# Patient Record
Sex: MALE | Race: WHITE | NOT HISPANIC OR LATINO | Employment: OTHER | URBAN - NONMETROPOLITAN AREA
[De-identification: names, ages, dates, MRNs, and addresses within clinical notes are randomized per-mention and may not be internally consistent; named-entity substitution may affect disease eponyms.]

---

## 2017-10-03 RX ORDER — CARVEDILOL 3.12 MG/1
TABLET ORAL
Qty: 60 TABLET | Refills: 0 | OUTPATIENT
Start: 2017-10-03

## 2017-10-03 RX ORDER — ATORVASTATIN CALCIUM 80 MG/1
TABLET, FILM COATED ORAL
Qty: 30 TABLET | Refills: 0 | OUTPATIENT
Start: 2017-10-03

## 2021-05-24 ENCOUNTER — HOSPITAL ENCOUNTER (EMERGENCY)
Facility: HOSPITAL | Age: 71
Discharge: LEFT BEFORE DISCHARGE | End: 2021-05-24
Attending: EMERGENCY MEDICINE | Admitting: EMERGENCY MEDICINE

## 2021-05-24 VITALS
TEMPERATURE: 99 F | RESPIRATION RATE: 18 BRPM | OXYGEN SATURATION: 94 % | WEIGHT: 143.8 LBS | DIASTOLIC BLOOD PRESSURE: 95 MMHG | HEIGHT: 69 IN | SYSTOLIC BLOOD PRESSURE: 116 MMHG | BODY MASS INDEX: 21.3 KG/M2 | HEART RATE: 76 BPM

## 2021-05-24 DIAGNOSIS — Z59.00 HOMELESSNESS: Primary | ICD-10-CM

## 2021-05-24 PROCEDURE — 99281 EMR DPT VST MAYX REQ PHY/QHP: CPT

## 2021-05-24 SDOH — ECONOMIC STABILITY - HOUSING INSECURITY: HOMELESSNESS UNSPECIFIED: Z59.00

## 2021-05-24 NOTE — CASE MANAGEMENT/SOCIAL WORK
Continued Stay Note  [facility]     Patient Name: Wale Mayes  MRN: [id number]  Today's Date: 5/24/2021    Admit Date: 5/24/2021    Discharge Plan     Row Name 05/24/21 1321       Plan    Plan  Consult due to homelessness. Patient being cleared medically. Will follow to determine what options are open to patient.        Discharge Codes    No documentation.             Shelly Jeronimo LCSW

## 2021-05-24 NOTE — ED PROVIDER NOTES
"Subjective   Chief Complaint: Homeless  History of Present Illness: 70-year-old male comes in via EMS.  He was reportedly asleep under a tree in a park and was  and called EMS and he was brought here as he states he is homeless and has nowhere to go.  He denies any complaints of headache, chest pain, short of breath, abdominal pain or any illness.  He does have a history of coronary artery disease.  He has no complaints and does not want any work-up here.  He states \"I do not see any callin' for that as I do not have any problems right now.\"  He states he just needs help with some more ago as he is homeless.  Onset: Unknown  Timing: Ongoing  Exacerbating / Alleviating factors: Nothing makes symptoms better or worse as he has no symptoms at this time  Associated symptoms: None, no headache, no dizziness, no chest pain, no shortness of breath, no abdominal pain      Nurses Notes reviewed and agree, including vitals, allergies, social history and prior medical history.          Review of Systems   Constitutional:        Found sleeping in a local park stating he is homeless and needs help with his living situation   HENT: Negative.    Eyes: Negative.    Respiratory: Negative.    Cardiovascular: Negative.    Gastrointestinal: Negative.    Genitourinary: Negative.    Musculoskeletal: Negative.    Skin: Negative.    Allergic/Immunologic: Negative.    Neurological: Negative.    Psychiatric/Behavioral: Negative.    All other systems reviewed and are negative.      Past Medical History:   Diagnosis Date   • Coronary artery disease        No Known Allergies    Past Surgical History:   Procedure Laterality Date   • CARDIAC SURGERY         History reviewed. No pertinent family history.    Social History     Socioeconomic History   • Marital status: Single     Spouse name: Not on file   • Number of children: Not on file   • Years of education: Not on file   • Highest education level: Not on file   Tobacco Use   • Smoking " "status: Current Every Day Smoker     Packs/day: 2.00     Years: 20.00     Pack years: 40.00     Types: Cigarettes   • Smokeless tobacco: Current User   Substance and Sexual Activity   • Alcohol use: Yes     Comment: \"once in a while\"   • Drug use: Not Currently     Types: Marijuana   • Sexual activity: Defer           Objective   Physical Exam  Vitals and nursing note reviewed.   Constitutional:       General: He is not in acute distress.     Appearance: Normal appearance. He is normal weight. He is not ill-appearing or diaphoretic.   HENT:      Head: Normocephalic.      Nose: Nose normal.   Eyes:      Extraocular Movements: Extraocular movements intact.   Cardiovascular:      Rate and Rhythm: Normal rate and regular rhythm.   Pulmonary:      Effort: Pulmonary effort is normal.   Abdominal:      Palpations: Abdomen is soft.   Musculoskeletal:         General: Normal range of motion.      Cervical back: Normal range of motion.   Skin:     General: Skin is warm and dry.   Neurological:      General: No focal deficit present.      Mental Status: He is alert and oriented to person, place, and time.      Gait: Gait normal.   Psychiatric:         Mood and Affect: Mood normal.         Behavior: Behavior normal.         Procedures           ED Course      Social work spoke with the patient and found him shelter in [place].  He states he did not go there.  He did not want any testing done here.  He is awake alert oriented and ambulating around the room.  Shortly after social work spoke with the patient nursing staff alerted me that he had decided to leave [facility].  I did not have a chance to speak with the patient prior to him leaving.                                     MDM    Final diagnoses:   Homelessness       ED Disposition  ED Disposition     ED Disposition Condition Comment    [facility]            PATIENT [facility] [address]  [phone]    As needed    [facility] Emergency " Department  [address] [address] [phone]    If symptoms worsen         Medication List      No changes were made to your prescriptions during this visit.          Wenceslao Yanez PA-C  05/24/21 1452

## 2021-05-24 NOTE — ED NOTES
Social work contacted at this time, awaiting call back.     [facility]  05/24/21 0605

## 2021-05-24 NOTE — ED NOTES
Pt refusing help with placement because he does not want to go to [place]. Will sign out John Julien RN  05/24/21 5009

## 2021-05-24 NOTE — CASE MANAGEMENT/SOCIAL WORK
Discharge Planning Assessment  [facility]     Patient Name: Wale Mayes  MRN: [id number]  Today's Date: 5/24/2021    Admit Date: 5/24/2021    Discharge Needs Assessment     Row Name 05/24/21 1521       Living Environment    Lives With  other (see comments)    Unique Family Situation  homeless    Primary Care Provided by  self    Provides Primary Care For  no one    Able to Return to Prior Arrangements  yes    Living Arrangement Comments  living in woods       Resource/Environmental Concerns    Resource/Environmental Concerns  other (see comments)    Transportation Concerns  other (see comments)       Transition Planning    Patient/Family Anticipates Transition to  other (see comments)       Discharge Needs Assessment    Readmission Within the Last 30 Days  no previous admission in last 30 days    Equipment Currently Used at Home  none    Concerns to be Addressed  homelessness    Concerns Comments  refuses shelter    Provided Post Acute Provider List?  N/A    Discharge Coordination/Progress  homeless, sent to daughter's presumed work        Discharge Plan     Row Name 05/24/21 1527       Plan    Plan  Spoke to patient who stated he was on his disability , is on low income housing list. States he is staying in woods. No o2, dme or home health, guardian or community . Declines shelter in [place]. Picked up sleeping outside by ambulance and brought in. Has fishing pole. States not on any medications. Patient decided to get taxi voucher to daughter's work to see if she can help. Other kids are in [facility] and per patient are scared of him. Nurse aware. Provided taxi voucher for patient.    Row Name 05/24/21 4066       Plan    Plan  Consult due to homelessness. Patient being cleared medically. Will follow to determine what options are open to patient.        Continued Care and Services - Discharged on 5/24/2021 Admission date: 5/24/2021 - Discharge disposition: Home or Self Care   Coordination has not  been started for this encounter.         Demographic Summary     Row Name 05/24/21 1520       General Information    Admission Type  other (see comments)    Arrived From  emergency department    Referral Source  emergency department    Reason for Consult  other (see comments)    Preferred Language  English        Functional Status     Row Name 05/24/21 1521       Functional Status, IADL    Medications  independent    Meal Preparation  independent    Housekeeping  independent    Laundry  independent    Shopping  independent    IADL Comments  walks       Employment/    Employment Status  disabled        Psychosocial    No documentation.       Abuse/Neglect    No documentation.       Legal    No documentation.       Substance Abuse    No documentation.       Patient Forms    No documentation.           Shelly Jeronimo LCSW

## 2021-05-25 NOTE — CASE MANAGEMENT/SOCIAL WORK
Continued Stay Note  [facility]     Patient Name: Wale Mayes  MRN: [id number]  Today's Date: 5/25/2021    Admit Date: 5/24/2021    Discharge Plan     Row Name 05/25/21 0813       Plan    Plan  Consult on call . Patient now stating his daughter works at [employer] and he requested taxi voucher. House provided taxi voucher as requested.        Discharge Codes    No documentation.             MAI LermaW

## 2023-01-18 ENCOUNTER — HOME HEALTH ADMISSION (OUTPATIENT)
Dept: HOME HEALTH SERVICES | Facility: HOME HEALTHCARE | Age: 73
End: 2023-01-18
Payer: MEDICARE

## 2023-01-18 ENCOUNTER — TRANSCRIBE ORDERS (OUTPATIENT)
Dept: HOME HEALTH SERVICES | Facility: HOME HEALTHCARE | Age: 73
End: 2023-01-18
Payer: MEDICARE

## 2023-01-18 DIAGNOSIS — J96.01 ACUTE RESPIRATORY FAILURE WITH HYPOXIA: Primary | ICD-10-CM

## 2023-07-17 PROBLEM — I26.94 MULTIPLE SUBSEGMENTAL PULMONARY EMBOLI WITHOUT ACUTE COR PULMONALE: Chronic | Status: ACTIVE | Noted: 2023-07-08

## 2023-07-17 PROBLEM — I26.99 PULMONARY EMBOLISM, BILATERAL: Chronic | Status: ACTIVE | Noted: 2023-07-08

## 2023-07-17 PROBLEM — R60.0 PEDAL EDEMA: Status: ACTIVE | Noted: 2023-07-08

## 2023-07-17 PROBLEM — G93.40 ENCEPHALOPATHY: Status: ACTIVE | Noted: 2023-07-17

## 2023-07-17 PROBLEM — R06.2 WHEEZING: Status: ACTIVE | Noted: 2023-07-08

## 2023-07-17 PROBLEM — I21.4 NON-STEMI (NON-ST ELEVATED MYOCARDIAL INFARCTION): Chronic | Status: ACTIVE | Noted: 2023-07-08

## 2023-07-17 PROBLEM — J44.1 COPD WITH EXACERBATION: Status: ACTIVE | Noted: 2023-07-17

## 2023-07-17 PROBLEM — R22.42 LOCALIZED SWELLING, MASS AND LUMP, LOWER LIMB, LEFT: Status: ACTIVE | Noted: 2023-07-17

## 2023-07-17 PROBLEM — F10.231 ALCOHOL DEPENDENCE WITH WITHDRAWAL DELIRIUM: Status: ACTIVE | Noted: 2023-07-17

## 2023-07-17 PROBLEM — J96.20 RESPIRATORY FAILURE, ACUTE-ON-CHRONIC: Status: ACTIVE | Noted: 2023-07-17

## 2023-07-17 PROBLEM — R33.9 RETENTION OF URINE: Status: ACTIVE | Noted: 2023-07-17

## 2023-07-17 PROBLEM — R06.02 SOB (SHORTNESS OF BREATH): Status: ACTIVE | Noted: 2023-07-08

## 2023-07-17 PROBLEM — A41.9 SEPSIS, UNSPECIFIED ORGANISM: Status: ACTIVE | Noted: 2023-07-17

## 2023-07-17 PROBLEM — R07.9 CHEST PAIN: Status: ACTIVE | Noted: 2023-07-17

## 2023-07-17 PROBLEM — J43.8 OTHER EMPHYSEMA: Status: ACTIVE | Noted: 2023-07-17

## 2023-07-17 PROBLEM — I50.9 CHRONIC CONGESTIVE HEART FAILURE: Chronic | Status: ACTIVE | Noted: 2023-07-08

## 2023-07-17 PROBLEM — F17.200 NICOTINE DEPENDENCE: Status: ACTIVE | Noted: 2023-07-17

## 2023-07-18 PROBLEM — N40.1 BPH WITH OBSTRUCTION/LOWER URINARY TRACT SYMPTOMS: Status: ACTIVE | Noted: 2023-07-18

## 2023-07-18 PROBLEM — N13.8 BPH WITH OBSTRUCTION/LOWER URINARY TRACT SYMPTOMS: Status: ACTIVE | Noted: 2023-07-18

## 2023-07-18 PROBLEM — Z74.09 IMPAIRED MOBILITY AND ADLS: Status: ACTIVE | Noted: 2023-07-18

## 2023-07-18 PROBLEM — Z78.9 IMPAIRED MOBILITY AND ADLS: Status: ACTIVE | Noted: 2023-07-18

## 2023-07-18 PROBLEM — Z79.01 ANTICOAGULANT LONG-TERM USE: Status: ACTIVE | Noted: 2023-07-18

## 2023-07-19 ENCOUNTER — NURSING HOME (OUTPATIENT)
Dept: FAMILY MEDICINE CLINIC | Facility: CLINIC | Age: 73
End: 2023-07-19
Payer: MEDICARE

## 2023-07-19 VITALS
TEMPERATURE: 97.9 F | SYSTOLIC BLOOD PRESSURE: 100 MMHG | RESPIRATION RATE: 18 BRPM | DIASTOLIC BLOOD PRESSURE: 62 MMHG | WEIGHT: 124 LBS | BODY MASS INDEX: 18.31 KG/M2 | HEART RATE: 71 BPM | OXYGEN SATURATION: 99 %

## 2023-07-19 DIAGNOSIS — I26.99 PULMONARY EMBOLISM, BILATERAL: Chronic | ICD-10-CM

## 2023-07-19 DIAGNOSIS — Z74.09 IMPAIRED MOBILITY AND ADLS: Primary | ICD-10-CM

## 2023-07-19 DIAGNOSIS — I50.22 CHRONIC SYSTOLIC CONGESTIVE HEART FAILURE: Chronic | ICD-10-CM

## 2023-07-19 DIAGNOSIS — Z79.01 ANTICOAGULANT LONG-TERM USE: ICD-10-CM

## 2023-07-19 DIAGNOSIS — M86.9 OSTEOMYELITIS OF FIFTH TOE OF LEFT FOOT: ICD-10-CM

## 2023-07-19 DIAGNOSIS — N13.8 BPH WITH OBSTRUCTION/LOWER URINARY TRACT SYMPTOMS: ICD-10-CM

## 2023-07-19 DIAGNOSIS — R53.81 PHYSICAL DECONDITIONING: ICD-10-CM

## 2023-07-19 DIAGNOSIS — J96.21 ACUTE ON CHRONIC RESPIRATORY FAILURE WITH HYPOXIA: ICD-10-CM

## 2023-07-19 DIAGNOSIS — J96.11 CHRONIC RESPIRATORY FAILURE WITH HYPOXIA: ICD-10-CM

## 2023-07-19 DIAGNOSIS — F17.200 NICOTINE DEPENDENCE, UNCOMPLICATED, UNSPECIFIED NICOTINE PRODUCT TYPE: ICD-10-CM

## 2023-07-19 DIAGNOSIS — Z78.9 IMPAIRED MOBILITY AND ADLS: Primary | ICD-10-CM

## 2023-07-19 DIAGNOSIS — I73.9 PERIPHERAL VASCULAR DISEASE: ICD-10-CM

## 2023-07-19 DIAGNOSIS — N40.1 BPH WITH OBSTRUCTION/LOWER URINARY TRACT SYMPTOMS: ICD-10-CM

## 2023-07-29 ENCOUNTER — HOSPITAL ENCOUNTER (EMERGENCY)
Facility: HOSPITAL | Age: 73
Discharge: NURSING, REHAB OR LONG-TERM CARE | End: 2023-07-29
Attending: EMERGENCY MEDICINE
Payer: MEDICARE

## 2023-07-29 VITALS
WEIGHT: 124 LBS | RESPIRATION RATE: 16 BRPM | BODY MASS INDEX: 18.37 KG/M2 | DIASTOLIC BLOOD PRESSURE: 62 MMHG | HEIGHT: 69 IN | TEMPERATURE: 98.7 F | HEART RATE: 94 BPM | OXYGEN SATURATION: 96 % | SYSTOLIC BLOOD PRESSURE: 118 MMHG

## 2023-07-29 DIAGNOSIS — Z45.2 ADJUSTMENT AND MANAGEMENT OF VASCULAR ACCESS DEVICE: Primary | ICD-10-CM

## 2023-07-29 PROCEDURE — 25010000002 HEPARIN LOCK FLUSH PER 10 UNITS

## 2023-07-29 PROCEDURE — 99283 EMERGENCY DEPT VISIT LOW MDM: CPT

## 2023-07-29 RX ORDER — HEPARIN SODIUM (PORCINE) LOCK FLUSH IV SOLN 100 UNIT/ML 100 UNIT/ML
SOLUTION INTRAVENOUS
Status: COMPLETED
Start: 2023-07-29 | End: 2023-07-29

## 2023-07-29 RX ORDER — HEPARIN SODIUM (PORCINE) LOCK FLUSH IV SOLN 100 UNIT/ML 100 UNIT/ML
300 SOLUTION INTRAVENOUS ONCE
Status: COMPLETED | OUTPATIENT
Start: 2023-07-29 | End: 2023-07-29

## 2023-07-29 RX ADMIN — HEPARIN 300 UNITS: 100 SYRINGE at 01:00

## 2023-07-29 RX ADMIN — HEPARIN SODIUM (PORCINE) LOCK FLUSH IV SOLN 100 UNIT/ML 300 UNITS: 100 SOLUTION at 01:00

## 2023-08-03 ENCOUNTER — NURSING HOME (OUTPATIENT)
Dept: FAMILY MEDICINE CLINIC | Facility: CLINIC | Age: 73
End: 2023-08-03
Payer: MEDICARE

## 2023-08-03 VITALS
OXYGEN SATURATION: 97 % | HEART RATE: 74 BPM | SYSTOLIC BLOOD PRESSURE: 122 MMHG | DIASTOLIC BLOOD PRESSURE: 60 MMHG | WEIGHT: 121 LBS | BODY MASS INDEX: 17.87 KG/M2 | TEMPERATURE: 97.8 F | RESPIRATION RATE: 18 BRPM

## 2023-08-03 DIAGNOSIS — Z78.9 IMPAIRED MOBILITY AND ADLS: ICD-10-CM

## 2023-08-03 DIAGNOSIS — M54.50 CHRONIC MIDLINE LOW BACK PAIN WITHOUT SCIATICA: ICD-10-CM

## 2023-08-03 DIAGNOSIS — Z74.09 IMPAIRED MOBILITY AND ADLS: ICD-10-CM

## 2023-08-03 DIAGNOSIS — R63.4 WEIGHT LOSS: Primary | ICD-10-CM

## 2023-08-03 DIAGNOSIS — G89.29 CHRONIC MIDLINE LOW BACK PAIN WITHOUT SCIATICA: ICD-10-CM

## 2023-08-03 DIAGNOSIS — R63.0 ANOREXIA: ICD-10-CM

## 2023-08-04 ENCOUNTER — NURSING HOME (OUTPATIENT)
Dept: FAMILY MEDICINE CLINIC | Facility: CLINIC | Age: 73
End: 2023-08-04
Payer: MEDICARE

## 2023-08-04 VITALS
SYSTOLIC BLOOD PRESSURE: 134 MMHG | DIASTOLIC BLOOD PRESSURE: 74 MMHG | OXYGEN SATURATION: 91 % | TEMPERATURE: 98.1 F | RESPIRATION RATE: 24 BRPM | WEIGHT: 121 LBS | BODY MASS INDEX: 17.87 KG/M2 | HEART RATE: 78 BPM

## 2023-08-04 DIAGNOSIS — Z74.09 IMPAIRED MOBILITY AND ADLS: ICD-10-CM

## 2023-08-04 DIAGNOSIS — J96.11 CHRONIC RESPIRATORY FAILURE WITH HYPOXIA: ICD-10-CM

## 2023-08-04 DIAGNOSIS — J42 CHRONIC BRONCHITIS, UNSPECIFIED CHRONIC BRONCHITIS TYPE: ICD-10-CM

## 2023-08-04 DIAGNOSIS — F17.210 CIGARETTE NICOTINE DEPENDENCE WITHOUT COMPLICATION: ICD-10-CM

## 2023-08-04 DIAGNOSIS — Z78.9 IMPAIRED MOBILITY AND ADLS: ICD-10-CM

## 2023-08-04 NOTE — LETTER
Nursing Home Follow Up Note      Rishi Castellano DO []   [facility] [x]  [address] [place] [address]  Phone: [phone]  Fax: [phone] Samm Mclaughlin MD []    Oscar Mena DO []   [address] [place] [address]  Phone: [phone]  Fax: [phone]     PATIENT NAME: Wale Mayes                                                                          YOB: 1950           DATE OF SERVICE: 08/4/2023  FACILITY:  [][facility]   [] [facility]   [] [facility]   [x] [facility]   [] Other ______________________________________________________________________      CHIEF COMPLAINT:    Increased cough and shortness of breath this am.     HISTORY OF PRESENT ILLNESS:     Nursing reports that early this morning when patient first woke up he had increased cough and increased shortness of breath. His 02 was 91 % on RA. He was placed on 2L NC and sats increased to 94%. He reported he felt better after this. He reported to nursing that he is always more short of breath in the mornings related to his COPD. Nursing reports that shortly after he felt better he removed his oxygen and went outside for the 0900 smoke break. He is moving about the facility in his wheelchair during visit. He reports he feels better now and has no concerns. Again reports that he always has issues early in the morning due to his COPD. He does not want to see if he needs C Pap or 02 at night because he will not wear it. He is getting ready to go out to smoke again now. He has no complaints or signs and symptoms of any distress. He did have CXR performed and results report no abnormal findings.     PAST MEDICAL & SURGICAL HISTORY:   Past Medical History:   Diagnosis Date    Alcohol dependence     BPH (benign prostatic hyperplasia)     COPD (chronic obstructive pulmonary disease)     Coronary artery disease     Depression     GERD (gastroesophageal reflux disease)     Hypertension     Myocardial infarction      "Nicotine dependence, cigarettes, with other nicotine-induced disorders     Pneumonia     Pulmonary embolism     SOB (shortness of breath)       Past Surgical History:   Procedure Laterality Date    CARDIAC SURGERY           MEDICATIONS:  I have reviewed and reconciled the patients medication list in the patients chart at the skilled nursing facility today.      ALLERGIES:    No Known Allergies      SOCIAL HISTORY:    Social History     Socioeconomic History    Marital status: Single   Tobacco Use    Smoking status: Every Day     Packs/day: 2.00     Years: 20.00     Pack years: 40.00     Types: Cigarettes    Smokeless tobacco: Never   Vaping Use    Vaping Use: Unknown   Substance and Sexual Activity    Alcohol use: Yes     Comment: \"once in a while\"    Drug use: Not Currently     Types: Marijuana    Sexual activity: Defer       FAMILY HISTORY:    Family History   Family history unknown: Yes       REVIEW OF SYSTEMS:    Review of Systems   Constitutional:  Positive for appetite change and unexpected weight loss. Negative for activity change, chills, diaphoresis, fatigue, fever and unexpected weight gain.   HENT:  Negative for congestion, mouth sores, nosebleeds, postnasal drip, rhinorrhea, sneezing, sore throat, swollen glands and trouble swallowing.    Eyes:  Negative for pain, discharge, redness, itching and visual disturbance.   Respiratory:  Positive for apnea, cough and shortness of breath. Negative for choking, chest tightness and wheezing.         COPD   Cardiovascular:  Negative for chest pain, palpitations and leg swelling.   Gastrointestinal:  Negative for abdominal distention, abdominal pain, blood in stool, constipation, diarrhea, nausea, vomiting, GERD and indigestion.   Genitourinary:  Negative for decreased urine volume, difficulty urinating, dysuria, flank pain, frequency, hematuria, urgency and urinary incontinence.   Musculoskeletal:  Positive for arthralgias, back pain and gait problem. Negative for " myalgias.   Skin:  Positive for wound (left foot-chronic). Negative for color change, dry skin, rash and skin lesions.   Neurological:  Positive for weakness, memory problem and confusion. Negative for seizures and speech difficulty.   Psychiatric/Behavioral:  Positive for agitation, behavioral problems and depressed mood. Negative for dysphoric mood, hallucinations and sleep disturbance. The patient is not nervous/anxious.        PHYSICAL EXAMINATION:   VITAL SIGNS:   Vitals:    08/04/23 1437   BP: 134/74   Pulse: 78   Resp: 24   Temp: 98.1 øF (36.7 øC)   SpO2: 91%   Weight: 54.9 kg (121 lb)        Physical Exam  Vitals and nursing note reviewed.   Constitutional:       Appearance: He is well-developed.   HENT:      Head: Normocephalic.   Eyes:      Conjunctiva/sclera: Conjunctivae normal.   Cardiovascular:      Rate and Rhythm: Normal rate and regular rhythm.      Heart sounds: Normal heart sounds.   Pulmonary:      Effort: Pulmonary effort is normal. No respiratory distress.      Breath sounds: Decreased breath sounds present.      Comments: COPD  Abdominal:      General: Bowel sounds are normal. There is no distension.      Palpations: Abdomen is soft.      Tenderness: There is no abdominal tenderness.   Musculoskeletal:      Cervical back: Normal range of motion.      Lumbar back: Tenderness present. Decreased range of motion.        Feet:       Comments: LE weakness   Skin:     General: Skin is warm and dry.      Findings: No rash.   Neurological:      Mental Status: He is alert and oriented to person, place, and time.      Comments: Intermittent confusion   Psychiatric:         Mood and Affect: Mood normal. Affect is flat.         Behavior: Behavior normal.         Cognition and Memory: Cognition is impaired. Memory is impaired.       RECORDS REVIEW:   I have reviewed and interpreted the records in [facility]    ASSESSMENT     Diagnoses and all orders for this visit:    1. Chronic bronchitis, unspecified chronic  "bronchitis type    2. Cigarette nicotine dependence without complication    3. Chronic respiratory failure with hypoxia    4. Impaired mobility and ADLs        PLAN    COPD/tobacco dependence/chronic respiratory failure  -CXR report negative for any acute findings. 02 at 2-3 L prn to keep sats > 90%. He is aware of risks of continue tobacco abuse but is not interested in cessation at this time. He refuses any further workup. Administer prn Nebs and cough medication.     Will follow up and continue to monitor.     Nursing to continue supportive care for all ADLs.    Nursing encouraged to keep me informed of any acute changes, lack of improvement, or any new concerning symptoms.       [x]  Discussed Patient in detail with nursing/staff, addressed all needs today.     [x]  Plan of Care Reviewed   [x]  PT/OT Reviewed   [x]  Order Changes  [x]  Discharge Plans Reviewed  [x]  Advance Directive on file with Nursing Home.   [x]  POA on file with Nursing Home.   [x]  Code Status listed: []  Full Code   []  DNR         "I confirm accuracy of unchanged data/findings which have been carried forward from previous visit, as well as I have updated appropriately those that have changed."       Staci Farmer, APRN.       "

## 2023-08-05 PROBLEM — A41.9 SEPSIS, UNSPECIFIED ORGANISM: Status: RESOLVED | Noted: 2023-07-17 | Resolved: 2023-08-05

## 2023-08-06 PROBLEM — J96.11 CHRONIC RESPIRATORY FAILURE WITH HYPOXIA: Status: ACTIVE | Noted: 2023-08-06

## 2023-08-06 NOTE — ED PROVIDER NOTES
"Subjective  History of Present Illness:    Chief Complaint: PICC line would not flush    History of Present Illness: 72-year-old male presents from nursing home, per report PICC line would not flush    Nurses Notes reviewed and agree, including vitals, allergies, social history and prior medical history.     REVIEW OF SYSTEMS: All systems reviewed and not pertinent unless noted.  Review of Systems      Positive for: PICC line would not flush    Negative for: Other complaints    Past Medical History:   Diagnosis Date    Alcohol dependence     BPH (benign prostatic hyperplasia)     COPD (chronic obstructive pulmonary disease)     Coronary artery disease     Depression     GERD (gastroesophageal reflux disease)     Hypertension     Myocardial infarction     Nicotine dependence, cigarettes, with other nicotine-induced disorders     Pneumonia     Pulmonary embolism     SOB (shortness of breath)        Allergies:    Patient has no known allergies.      Past Surgical History:   Procedure Laterality Date    CARDIAC SURGERY           Social History     Socioeconomic History    Marital status: Single   Tobacco Use    Smoking status: Every Day     Packs/day: 2.00     Years: 20.00     Pack years: 40.00     Types: Cigarettes    Smokeless tobacco: Never   Vaping Use    Vaping Use: Unknown   Substance and Sexual Activity    Alcohol use: Yes     Comment: \"once in a while\"    Drug use: Not Currently     Types: Marijuana    Sexual activity: Defer         Family History   Family history unknown: Yes       Objective  Physical Exam:  /62 (BP Location: Left arm, Patient Position: Lying)   Pulse 94   Temp 98.7 øF (37.1 øC) (Oral)   Resp 16   Ht 175.3 cm (69\")   Wt 56.2 kg (124 lb)   SpO2 96%   BMI 18.31 kg/mý      Physical Exam    CONSTITUTIONAL: Well developed, nontoxic 72-year-old male,  in no acute distress.  VITAL SIGNS: per nursing, reviewed and noted  SKIN: exposed skin with no rashes, ulcerations or petechiae.  PICC " line in place right upper extremity with dressing overlying PICC line with a PICC line sitting at an odd angle  EYES: Grossly EOMI, no icterus  ENT: Normal voice.  Moist mucous membranes   RESPIRATORY:  No increased work of breathing. No retractions.   CARDIOVASCULAR:   Extremities pink and warm.  Good cap refill to extremities.   MUSCULOSKELETAL: Age-appropriate bulk and tone, moves all 4 extremities  NEUROLOGIC: Alert, oriented x 3. No gross deficits. GCS 15.   PSYCH: appropriate affect.      Procedures    ED Course:    Lab Results (last 24 hours)       ** No results found for the last 24 hours. **             No radiology results from the last 24 hrs     MDM         Medical Decision Making:    Initial impression of presenting illness: 72-year-old male presents from nursing home, per report PICC line would not flush    DDX: includes but is not limited to: PICC line occlusion, displacement, among others         Patient arrives normotensive afebrile oxygen saturations 96% with vitals interpreted by myself.     Pertinent features from physical exam: PICC line in place right upper extremity with odd angle of positioning with dressing covering the insertion site.    Interventions / Re-evaluation: Nursing staff changed the dressing and ready position to the PICC line with good flush and flow    Results/clinical rationale were discussed with patient    Consultations/Discussion of results with other physicians: None    Disposition plan: Patient was discharged in home stable condition.  Counseled on supportive care, outpatient follow-up. Return precaution discussed.  Patient/family was understanding and agreeable with plan    -----      Final diagnoses:   Adjustment and management of vascular access device            Hoang Smith, DO  08/06/23 7859

## 2023-08-06 NOTE — PROGRESS NOTES
Nursing Home Follow Up Note      Rishi Castellano DO []   HARLEY Dao [x]  [address] [place] [address]  Phone: [phone]  Fax: [phone] Samm Mclaughlin MD []    Oscar Mena DO []   [address] [place] [address]  Phone: [phone]  Fax: [phone]     PATIENT NAME: Wale Mayes                                                                          YOB: 1950           DATE OF SERVICE: 08/4/2023  FACILITY:  [][place]   [] [place]   [] [facility]   [x] [facility]   [] Other ______________________________________________________________________      CHIEF COMPLAINT:    Increased cough and shortness of breath this am.     HISTORY OF PRESENT ILLNESS:     Nursing reports that early this morning when patient first woke up he had increased cough and increased shortness of breath. His 02 was 91 % on RA. He was placed on 2L NC and sats increased to 94%. He reported he felt better after this. He reported to nursing that he is always more short of breath in the mornings related to his COPD. Nursing reports that shortly after he felt better he removed his oxygen and went outside for the 0900 smoke break. He is moving about the facility in his wheelchair during visit. He reports he feels better now and has no concerns. Again reports that he always has issues early in the morning due to his COPD. He does not want to see if he needs C Pap or 02 at night because he will not wear it. He is getting ready to go out to smoke again now. He has no complaints or signs and symptoms of any distress. He did have CXR performed and results report no abnormal findings.     PAST MEDICAL & SURGICAL HISTORY:   Past Medical History:   Diagnosis Date    Alcohol dependence     BPH (benign prostatic hyperplasia)     COPD (chronic obstructive pulmonary disease)     Coronary artery disease     Depression     GERD (gastroesophageal reflux disease)     Hypertension     Myocardial infarction      "Nicotine dependence, cigarettes, with other nicotine-induced disorders     Pneumonia     Pulmonary embolism     SOB (shortness of breath)       Past Surgical History:   Procedure Laterality Date    CARDIAC SURGERY           MEDICATIONS:  I have reviewed and reconciled the patients medication list in the patients chart at the skilled nursing facility today.      ALLERGIES:    No Known Allergies      SOCIAL HISTORY:    Social History     Socioeconomic History    Marital status: Single   Tobacco Use    Smoking status: Every Day     Packs/day: 2.00     Years: 20.00     Pack years: 40.00     Types: Cigarettes    Smokeless tobacco: Never   Vaping Use    Vaping Use: Unknown   Substance and Sexual Activity    Alcohol use: Yes     Comment: \"once in a while\"    Drug use: Not Currently     Types: Marijuana    Sexual activity: Defer       FAMILY HISTORY:    Family History   Family history unknown: Yes       REVIEW OF SYSTEMS:    Review of Systems   Constitutional:  Positive for appetite change and unexpected weight loss. Negative for activity change, chills, diaphoresis, fatigue, fever and unexpected weight gain.   HENT:  Negative for congestion, mouth sores, nosebleeds, postnasal drip, rhinorrhea, sneezing, sore throat, swollen glands and trouble swallowing.    Eyes:  Negative for pain, discharge, redness, itching and visual disturbance.   Respiratory:  Positive for apnea, cough and shortness of breath. Negative for choking, chest tightness and wheezing.         COPD   Cardiovascular:  Negative for chest pain, palpitations and leg swelling.   Gastrointestinal:  Negative for abdominal distention, abdominal pain, blood in stool, constipation, diarrhea, nausea, vomiting, GERD and indigestion.   Genitourinary:  Negative for decreased urine volume, difficulty urinating, dysuria, flank pain, frequency, hematuria, urgency and urinary incontinence.   Musculoskeletal:  Positive for arthralgias, back pain and gait problem. Negative for " myalgias.   Skin:  Positive for wound (left foot-chronic). Negative for color change, dry skin, rash and skin lesions.   Neurological:  Positive for weakness, memory problem and confusion. Negative for seizures and speech difficulty.   Psychiatric/Behavioral:  Positive for agitation, behavioral problems and depressed mood. Negative for dysphoric mood, hallucinations and sleep disturbance. The patient is not nervous/anxious.        PHYSICAL EXAMINATION:   VITAL SIGNS:   Vitals:    08/04/23 1437   BP: 134/74   Pulse: 78   Resp: 24   Temp: 98.1 øF (36.7 øC)   SpO2: 91%   Weight: 54.9 kg (121 lb)        Physical Exam  Vitals and nursing note reviewed.   Constitutional:       Appearance: He is well-developed.   HENT:      Head: Normocephalic.   Eyes:      Conjunctiva/sclera: Conjunctivae normal.   Cardiovascular:      Rate and Rhythm: Normal rate and regular rhythm.      Heart sounds: Normal heart sounds.   Pulmonary:      Effort: Pulmonary effort is normal. No respiratory distress.      Breath sounds: Decreased breath sounds present.      Comments: COPD  Abdominal:      General: Bowel sounds are normal. There is no distension.      Palpations: Abdomen is soft.      Tenderness: There is no abdominal tenderness.   Musculoskeletal:      Cervical back: Normal range of motion.      Lumbar back: Tenderness present. Decreased range of motion.        Feet:       Comments: LE weakness   Skin:     General: Skin is warm and dry.      Findings: No rash.   Neurological:      Mental Status: He is alert and oriented to person, place, and time.      Comments: Intermittent confusion   Psychiatric:         Mood and Affect: Mood normal. Affect is flat.         Behavior: Behavior normal.         Cognition and Memory: Cognition is impaired. Memory is impaired.       RECORDS REVIEW:   I have reviewed and interpreted [facility]    ASSESSMENT     Diagnoses and all orders for this visit:    1. Chronic bronchitis, unspecified chronic  "bronchitis type    2. Cigarette nicotine dependence without complication    3. Chronic respiratory failure with hypoxia    4. Impaired mobility and ADLs        PLAN    COPD/tobacco dependence/chronic respiratory failure  -CXR report negative for any acute findings. 02 at 2-3 L prn to keep sats > 90%. He is aware of risks of continue tobacco abuse but is not interested in cessation at this time. He refuses any further workup. Administer prn Nebs and cough medication.     Will follow up and continue to monitor.     Nursing to continue supportive care for all ADLs.    Nursing encouraged to keep me informed of any acute changes, lack of improvement, or any new concerning symptoms.       [x]  Discussed Patient in detail with nursing/staff, addressed all needs today.     [x]  Plan of Care Reviewed   [x]  PT/OT Reviewed   [x]  Order Changes  [x]  Discharge Plans Reviewed  [x]  Advance Directive on file with Nursing Home.   [x]  POA on file with Nursing Home.   [x]  Code Status listed: []  Full Code   []  DNR         "I confirm accuracy of unchanged data/findings which have been carried forward from previous visit, as well as I have updated appropriately those that have changed."       Staci Farmer, APRN.     "

## 2023-08-18 ENCOUNTER — NURSING HOME (OUTPATIENT)
Dept: FAMILY MEDICINE CLINIC | Facility: CLINIC | Age: 73
End: 2023-08-18
Payer: MEDICARE

## 2023-08-18 VITALS
BODY MASS INDEX: 17.28 KG/M2 | HEART RATE: 99 BPM | TEMPERATURE: 98.2 F | RESPIRATION RATE: 18 BRPM | DIASTOLIC BLOOD PRESSURE: 78 MMHG | WEIGHT: 117 LBS | OXYGEN SATURATION: 93 % | SYSTOLIC BLOOD PRESSURE: 118 MMHG

## 2023-08-18 DIAGNOSIS — R53.81 PHYSICAL DEBILITY: ICD-10-CM

## 2023-08-18 DIAGNOSIS — R53.83 FATIGUE, UNSPECIFIED TYPE: Primary | ICD-10-CM

## 2023-08-18 DIAGNOSIS — B37.49 CANDIDAL UTI (URINARY TRACT INFECTION): ICD-10-CM

## 2023-08-18 DIAGNOSIS — Z78.9 IMPAIRED MOBILITY AND ADLS: ICD-10-CM

## 2023-08-18 DIAGNOSIS — Z74.09 IMPAIRED MOBILITY AND ADLS: ICD-10-CM

## 2023-08-18 NOTE — LETTER
Nursing Home Follow Up Note      Rishi Castellano DO []   HARLEY Dao [x]  [address] [place] [address]  Phone: [phone]  Fax: [phone] Samm Mclaughlin MD []    Oscar Mena DO []   [address] [place] [address]  Phone: [phone]  Fax: [phone]     PATIENT NAME: Wale Mayes                                                                          YOB: 1950           DATE OF SERVICE: 08/18/2023  FACILITY:  [][place]   [] [place]   [] [facility]   [x] [facility]   [] Other ______________________________________________________________________      CHIEF COMPLAINT:    Increased fatigue for the past couple days.    Follow-up abnormal UA C&S results.    HISTORY OF PRESENT ILLNESS:     Nursing reports that patient has been more fatigued than usual the past couple days.  He was sitting up on the side of the bed during visit putting on his shoes to go out and smoke.  He reports that he had not felt as well as usual the past couple days but he does feel better today.  He will continue on IV Zosyn until 8/26 and then will start doxycycline oral, for treatment of his osteomyelitis.  Labs were performed related to his fatigue and were insignificant.  UA C&S results available today and report candidiasis.  He has no complaints or concerns today besides wishing he could go outside more often and smoke.  He does appear fatigued, but it appears at baseline.  No signs and symptoms of any distress.      PAST MEDICAL & SURGICAL HISTORY:   Past Medical History:   Diagnosis Date    Alcohol dependence     BPH (benign prostatic hyperplasia)     COPD (chronic obstructive pulmonary disease)     Coronary artery disease     Depression     GERD (gastroesophageal reflux disease)     Hypertension     Myocardial infarction     Nicotine dependence, cigarettes, with other nicotine-induced disorders     Pneumonia     Pulmonary embolism     SOB (shortness of breath)       Past Surgical  "History:   Procedure Laterality Date    CARDIAC SURGERY           MEDICATIONS:  I have reviewed and reconciled the patients medication list in the patients chart at the skilled nursing facility today.      ALLERGIES:    No Known Allergies      SOCIAL HISTORY:    Social History     Socioeconomic History    Marital status: Single   Tobacco Use    Smoking status: Every Day     Packs/day: 2.00     Years: 20.00     Pack years: 40.00     Types: Cigarettes    Smokeless tobacco: Never   Vaping Use    Vaping Use: Unknown   Substance and Sexual Activity    Alcohol use: Yes     Comment: \"once in a while\"    Drug use: Not Currently     Types: Marijuana    Sexual activity: Defer       FAMILY HISTORY:    Family History   Family history unknown: Yes       REVIEW OF SYSTEMS:    Review of Systems   Constitutional:  Positive for appetite change, fatigue and unexpected weight loss. Negative for activity change, chills, diaphoresis, fever and unexpected weight gain.   HENT:  Negative for congestion, mouth sores, nosebleeds, postnasal drip, rhinorrhea, sneezing, sore throat, swollen glands and trouble swallowing.    Eyes:  Negative for pain, discharge, redness, itching and visual disturbance.   Respiratory:  Positive for apnea, cough and shortness of breath. Negative for choking, chest tightness and wheezing.         COPD   Cardiovascular:  Negative for chest pain, palpitations and leg swelling.   Gastrointestinal:  Negative for abdominal distention, abdominal pain, blood in stool, constipation, diarrhea, nausea, vomiting, GERD and indigestion.   Genitourinary:  Negative for decreased urine volume, difficulty urinating, dysuria, flank pain, frequency, hematuria, urgency and urinary incontinence.   Musculoskeletal:  Positive for arthralgias, back pain and gait problem. Negative for myalgias.   Skin:  Positive for wound (left foot-chronic). Negative for color change, dry skin, rash and skin lesions.   Neurological:  Positive for weakness, " memory problem and confusion. Negative for seizures and speech difficulty.   Psychiatric/Behavioral:  Positive for agitation, behavioral problems and depressed mood. Negative for dysphoric mood, hallucinations and sleep disturbance. The patient is not nervous/anxious.        PHYSICAL EXAMINATION:   VITAL SIGNS:   Vitals:    08/18/23 1314   BP: 118/78   Pulse: 99   Resp: 18   Temp: 98.2 øF (36.8 øC)   SpO2: 93%   Weight: 53.1 kg (117 lb)        Physical Exam  Vitals and nursing note reviewed.   Constitutional:       Appearance: He is well-developed.   HENT:      Head: Normocephalic.   Eyes:      Conjunctiva/sclera: Conjunctivae normal.   Cardiovascular:      Rate and Rhythm: Normal rate and regular rhythm.      Heart sounds: Normal heart sounds.   Pulmonary:      Effort: Pulmonary effort is normal. No respiratory distress.      Breath sounds: Decreased breath sounds present.      Comments: COPD  Abdominal:      General: Bowel sounds are normal. There is no distension.      Palpations: Abdomen is soft.      Tenderness: There is no abdominal tenderness.   Musculoskeletal:      Cervical back: Normal range of motion.      Lumbar back: Tenderness present. Decreased range of motion.        Feet:       Comments: LE weakness   Skin:     General: Skin is warm and dry.      Findings: No rash.   Neurological:      Mental Status: He is alert and oriented to person, place, and time.      Comments: Intermittent confusion   Psychiatric:         Mood and Affect: Affect is flat and angry.         Behavior: Behavior normal.         Cognition and Memory: Cognition is impaired. Memory is impaired.      Comments: Angry that he cannot go out and smoke whenever he would like       RECORDS REVIEW:   I have reviewed and interpreted the records in [facility]    ASSESSMENT     Diagnoses and all orders for this visit:    1. Fatigue, unspecified type (Primary)    2. Candidal UTI (urinary tract infection)    3. Physical debility    4. Impaired mobility  "and ADLs        PLAN    Fatigue/candidal UTI  -Patient reports feeling better today.  Nursing to continue to monitor notify of any changes.  Will start Diflucan 100 mg p.o. daily x14 days.  Will follow-up with final results of C&S.    Will follow up and continue to monitor.     Nursing to continue supportive care for all ADLs.    Nursing encouraged to keep me informed of any acute changes, lack of improvement, or any new concerning symptoms.       [x]  Discussed Patient in detail with nursing/staff, addressed all needs today.     [x]  Plan of Care Reviewed   [x]  PT/OT Reviewed   [x]  Order Changes  [x]  Discharge Plans Reviewed  [x]  Advance Directive on file with Nursing Home.   [x]  POA on file with Nursing Home.   [x]  Code Status listed: []  Full Code   []  DNR         "I confirm accuracy of unchanged data/findings which have been carried forward from previous visit, as well as I have updated appropriately those that have changed."     I spent 30 minutes caring for Wale on this date of service. This time includes time spent by me in the following activities:preparing for the visit, reviewing tests, obtaining and/or reviewing a separately obtained history, performing a medically appropriate examination and/or evaluation , counseling and educating the patient/family/caregiver, ordering medications, tests, or procedures, referring and communicating with other health care professionals , documenting information in the medical record, independently interpreting results and communicating that information with the patient/family/caregiver, and care coordination       Staci Farmer, APRN.       "

## 2023-08-20 NOTE — PROGRESS NOTES
Nursing Home Follow Up Note      Rishi Castellano DO []   HARLEY Dao [x]  [address] [place] [address]  Phone: [phone]  Fax: [phone] Samm Mclaughlin MD []    Oscar Mena DO []   [address] [place] [address]  Phone: [phone]  Fax: [phone]     PATIENT NAME: Wale Mayes                                                                          YOB: 1950           DATE OF SERVICE: 08/18/2023  FACILITY:  [][place]   [] [place]   [] [facility]   [x] [facility]   [] Other ______________________________________________________________________      CHIEF COMPLAINT:    Increased fatigue for the past couple days.    Follow-up abnormal UA C&S results.    HISTORY OF PRESENT ILLNESS:     Nursing reports that patient has been more fatigued than usual the past couple days.  He was sitting up on the side of the bed during visit putting on his shoes to go out and smoke.  He reports that he had not felt as well as usual the past couple days but he does feel better today.  He will continue on IV Zosyn until 8/26 and then will start doxycycline oral, for treatment of his osteomyelitis.  Labs were performed related to his fatigue and were insignificant.  UA C&S results available today and report candidiasis.  He has no complaints or concerns today besides wishing he could go outside more often and smoke.  He does appear fatigued, but it appears at baseline.  No signs and symptoms of any distress.      PAST MEDICAL & SURGICAL HISTORY:   Past Medical History:   Diagnosis Date    Alcohol dependence     BPH (benign prostatic hyperplasia)     COPD (chronic obstructive pulmonary disease)     Coronary artery disease     Depression     GERD (gastroesophageal reflux disease)     Hypertension     Myocardial infarction     Nicotine dependence, cigarettes, with other nicotine-induced disorders     Pneumonia     Pulmonary embolism     SOB (shortness of breath)       Past Surgical  "History:   Procedure Laterality Date    CARDIAC SURGERY           MEDICATIONS:  I have reviewed and reconciled the patients medication list in the patients chart at the skilled nursing facility today.      ALLERGIES:    No Known Allergies      SOCIAL HISTORY:    Social History     Socioeconomic History    Marital status: Single   Tobacco Use    Smoking status: Every Day     Packs/day: 2.00     Years: 20.00     Pack years: 40.00     Types: Cigarettes    Smokeless tobacco: Never   Vaping Use    Vaping Use: Unknown   Substance and Sexual Activity    Alcohol use: Yes     Comment: \"once in a while\"    Drug use: Not Currently     Types: Marijuana    Sexual activity: Defer       FAMILY HISTORY:    Family History   Family history unknown: Yes       REVIEW OF SYSTEMS:    Review of Systems   Constitutional:  Positive for appetite change, fatigue and unexpected weight loss. Negative for activity change, chills, diaphoresis, fever and unexpected weight gain.   HENT:  Negative for congestion, mouth sores, nosebleeds, postnasal drip, rhinorrhea, sneezing, sore throat, swollen glands and trouble swallowing.    Eyes:  Negative for pain, discharge, redness, itching and visual disturbance.   Respiratory:  Positive for apnea, cough and shortness of breath. Negative for choking, chest tightness and wheezing.         COPD   Cardiovascular:  Negative for chest pain, palpitations and leg swelling.   Gastrointestinal:  Negative for abdominal distention, abdominal pain, blood in stool, constipation, diarrhea, nausea, vomiting, GERD and indigestion.   Genitourinary:  Negative for decreased urine volume, difficulty urinating, dysuria, flank pain, frequency, hematuria, urgency and urinary incontinence.   Musculoskeletal:  Positive for arthralgias, back pain and gait problem. Negative for myalgias.   Skin:  Positive for wound (left foot-chronic). Negative for color change, dry skin, rash and skin lesions.   Neurological:  Positive for weakness, " memory problem and confusion. Negative for seizures and speech difficulty.   Psychiatric/Behavioral:  Positive for agitation, behavioral problems and depressed mood. Negative for dysphoric mood, hallucinations and sleep disturbance. The patient is not nervous/anxious.        PHYSICAL EXAMINATION:   VITAL SIGNS:   Vitals:    08/18/23 1314   BP: 118/78   Pulse: 99   Resp: 18   Temp: 98.2 øF (36.8 øC)   SpO2: 93%   Weight: 53.1 kg (117 lb)        Physical Exam  Vitals and nursing note reviewed.   Constitutional:       Appearance: He is well-developed.   HENT:      Head: Normocephalic.   Eyes:      Conjunctiva/sclera: Conjunctivae normal.   Cardiovascular:      Rate and Rhythm: Normal rate and regular rhythm.      Heart sounds: Normal heart sounds.   Pulmonary:      Effort: Pulmonary effort is normal. No respiratory distress.      Breath sounds: Decreased breath sounds present.      Comments: COPD  Abdominal:      General: Bowel sounds are normal. There is no distension.      Palpations: Abdomen is soft.      Tenderness: There is no abdominal tenderness.   Musculoskeletal:      Cervical back: Normal range of motion.      Lumbar back: Tenderness present. Decreased range of motion.        Feet:       Comments: LE weakness   Skin:     General: Skin is warm and dry.      Findings: No rash.   Neurological:      Mental Status: He is alert and oriented to person, place, and time.      Comments: Intermittent confusion   Psychiatric:         Mood and Affect: Affect is flat and angry.         Behavior: Behavior normal.         Cognition and Memory: Cognition is impaired. Memory is impaired.      Comments: Angry that he cannot go out and smoke whenever he would like       RECORDS REVIEW:   I have reviewed and interpreted the records in [facility]    ASSESSMENT     Diagnoses and all orders for this visit:    1. Fatigue, unspecified type (Primary)    2. Candidal UTI (urinary tract infection)    3. Physical debility    4. Impaired mobility  "and ADLs        PLAN    Fatigue/candidal UTI  -Patient reports feeling better today.  Nursing to continue to monitor notify of any changes.  Will start Diflucan 100 mg p.o. daily x14 days.  Will follow-up with final results of C&S.    Will follow up and continue to monitor.     Nursing to continue supportive care for all ADLs.    Nursing encouraged to keep me informed of any acute changes, lack of improvement, or any new concerning symptoms.       [x]  Discussed Patient in detail with nursing/staff, addressed all needs today.     [x]  Plan of Care Reviewed   [x]  PT/OT Reviewed   [x]  Order Changes  [x]  Discharge Plans Reviewed  [x]  Advance Directive on file with Nursing Home.   [x]  POA on file with Nursing Home.   [x]  Code Status listed: []  Full Code   []  DNR         "I confirm accuracy of unchanged data/findings which have been carried forward from previous visit, as well as I have updated appropriately those that have changed."     I spent 30 minutes caring for Wale on this date of service. This time includes time spent by me in the following activities:preparing for the visit, reviewing tests, obtaining and/or reviewing a separately obtained history, performing a medically appropriate examination and/or evaluation , counseling and educating the patient/family/caregiver, ordering medications, tests, or procedures, referring and communicating with other health care professionals , documenting information in the medical record, independently interpreting results and communicating that information with the patient/family/caregiver, and care coordination       Staci Farmer, APRN.     "

## 2023-08-23 ENCOUNTER — NURSING HOME (OUTPATIENT)
Dept: FAMILY MEDICINE CLINIC | Facility: CLINIC | Age: 73
End: 2023-08-23
Payer: MEDICARE

## 2023-08-23 VITALS
HEART RATE: 80 BPM | TEMPERATURE: 97.8 F | BODY MASS INDEX: 16.98 KG/M2 | WEIGHT: 115 LBS | DIASTOLIC BLOOD PRESSURE: 78 MMHG | RESPIRATION RATE: 18 BRPM | SYSTOLIC BLOOD PRESSURE: 118 MMHG

## 2023-08-23 DIAGNOSIS — N40.1 BPH WITH OBSTRUCTION/LOWER URINARY TRACT SYMPTOMS: ICD-10-CM

## 2023-08-23 DIAGNOSIS — N13.8 BPH WITH OBSTRUCTION/LOWER URINARY TRACT SYMPTOMS: ICD-10-CM

## 2023-08-23 DIAGNOSIS — I50.22 CHRONIC SYSTOLIC CONGESTIVE HEART FAILURE: Chronic | ICD-10-CM

## 2023-08-23 DIAGNOSIS — Z78.9 IMPAIRED MOBILITY AND ADLS: ICD-10-CM

## 2023-08-23 DIAGNOSIS — Z79.01 ANTICOAGULANT LONG-TERM USE: ICD-10-CM

## 2023-08-23 DIAGNOSIS — R53.81 PHYSICAL DECONDITIONING: ICD-10-CM

## 2023-08-23 DIAGNOSIS — I26.99 PULMONARY EMBOLISM, BILATERAL: Chronic | ICD-10-CM

## 2023-08-23 DIAGNOSIS — M86.9 OSTEOMYELITIS OF FIFTH TOE OF LEFT FOOT: Primary | ICD-10-CM

## 2023-08-23 DIAGNOSIS — I73.9 PERIPHERAL VASCULAR DISEASE: ICD-10-CM

## 2023-08-23 DIAGNOSIS — Z74.09 IMPAIRED MOBILITY AND ADLS: ICD-10-CM

## 2023-08-23 DIAGNOSIS — J96.11 CHRONIC RESPIRATORY FAILURE WITH HYPOXIA: ICD-10-CM

## 2023-10-18 PROBLEM — I50.22 CHRONIC SYSTOLIC CONGESTIVE HEART FAILURE: Chronic | Status: ACTIVE | Noted: 2023-07-08

## 2023-10-18 PROBLEM — M86.9 OSTEOMYELITIS OF FIFTH TOE OF LEFT FOOT: Status: ACTIVE | Noted: 2023-10-18

## 2023-10-18 PROBLEM — R53.81 PHYSICAL DECONDITIONING: Status: ACTIVE | Noted: 2023-10-18

## 2023-10-18 PROBLEM — I73.9 PERIPHERAL VASCULAR DISEASE: Status: ACTIVE | Noted: 2023-10-18

## 2023-10-18 NOTE — PROGRESS NOTES
Nursing Home Progress Note        Rishi Castellano DO [x]  HARLEY Dao []  [address] [place] [address]  Phone: [phone]  Fax: [phone] Samm Mclaughlin MD []  Oscar Mena DO []  [address] [place] [address]  Phone: [phone]  Fax: [phone]     PATIENT NAME: Wale Mayes                                                                          YOB: 1950           DATE OF SERVICE: 08/23/2023  FACILITY: []  [facility]  [] [facility]  []  [facility]  [x] [facility]  []  Other ______________________________________________________________________     CHIEF COMPLAINT:  Impaired mobility and ADLs/physical deconditioning/osteomyelitis of left foot, fifth phalanx/peripheral vascular disease/anticoagulation/bilateral pulmonary emboli/chronic respiratory failure with hypoxia/nicotine dependence/chronic systolic CHF      HISTORY OF PRESENT ILLNESS:   [x]  Follow Up visit for coordination of rehabilitative care issues and chronic medical management of Diagnoses and all orders for this visit:    1. Osteomyelitis of fifth toe of left foot (Primary)    2. Physical deconditioning    3. Impaired mobility and ADLs    4. Chronic respiratory failure with hypoxia    5. BPH with obstruction/lower urinary tract symptoms    6. Anticoagulant long-term use    7. Pulmonary embolism, bilateral    8. Chronic systolic congestive heart failure    9. Peripheral vascular disease    Patient recently underwent urinalysis secondary to increased malaise/fatigue.  Findings were suggestive of Candida UTI.  Has been started on Diflucan daily.  Planned duration of course was 14 days.    He is continues to diurese well.  No obvious findings of volume overload.    Vital signs have been stable, no increased work of breathing.  Continues on anticoagulation.    Continues to utilize nicotine replacement therapy.      PAST MEDICAL & SURGICAL HISTORY:   Past Medical History:   Diagnosis Date    Alcohol  "dependence     BPH (benign prostatic hyperplasia)     COPD (chronic obstructive pulmonary disease)     Coronary artery disease     Depression     GERD (gastroesophageal reflux disease)     Hypertension     Myocardial infarction     Nicotine dependence, cigarettes, with other nicotine-induced disorders     Pneumonia     Pulmonary embolism     SOB (shortness of breath)       Past Surgical History:   Procedure Laterality Date    CARDIAC SURGERY           MEDICATIONS:  I have reviewed and reconciled the patients medication list in the patients chart at the [facility] nursing [facility] today.      ALLERGIES:    No Known Allergies      SOCIAL HISTORY:    Social History     Socioeconomic History    Marital status: Single   Tobacco Use    Smoking status: Every Day     Packs/day: 2.00     Years: 20.00     Additional pack years: 0.00     Total pack years: 40.00     Types: Cigarettes    Smokeless tobacco: Never   Vaping Use    Vaping Use: Unknown   Substance and Sexual Activity    Alcohol use: Yes     Comment: \"once in a while\"    Drug use: Not Currently     Types: Marijuana    Sexual activity: Defer       FAMILY HISTORY:    Family History   Family history unknown: Yes       REVIEW OF SYSTEMS:    Review of Systems  Appetite: Fair []   Good [x]   Poor []   Weight Loss []  [x]  Weight Stable   Unavoidable Weight Loss []  Tolerating Tube Feeding []    Supplements Provided []   Constitutional: Negative for fever, chills, diaphoresis or fatigue and weight change.   HENT: No dysphagia; no changes to vision/hearing/smell/taste; no epistaxis  Eyes: Negative for redness and visual disturbance.   Respiratory: Negative for shortness of breath, chest pain, cough or chest tightness.   Cardiovascular: Negative for chest pain and palpitations.   Gastrointestinal: Negative for abdominal distention, abdominal pain and blood in stool.   Endocrine: Negative for cold intolerance and heat intolerance.   Genitourinary: Negative for difficulty " urinating, dysuria and frequency.Negative for hematuria   Musculoskeletal: Chronic myalgias and arthralgias  Integumentary: Left foot wound.  Neurological: Negative for syncope; generalized weakness.  Hematological: Negative for adenopathy. Does not bruise/bleed easily.   Immunological: Negative for reported allergies or immunological disorders  Psychological: As per above.    PHYSICAL EXAMINATION:   VITAL SIGNS:   Vitals:    08/23/23 0959   BP: 118/78   Pulse: 80   Resp: 18   Temp: 97.8 °F (36.6 °C)       Physical Exam    General Appearance:  [x]  Alert   [x]  Oriented x person  [x]  No acute distress     []  Confused  []  Disoriented   []  Comatose   Head:  Atraumatic and normocephalic, without obvious abnormality.   Eyes:         PERRLA, conjunctivae and sclerae normal, no Icterus. No pallor. Extra-occular movements are within normal limits.   Ears:  Ears appear intact with no abnormalities noted.   Throat: No oral lesions, no thrush, oral mucosa moist.   Neck: Supple, trachea midline, no thyromegaly, no carotid bruit.   Back:   No kyphoscoliosis. No tenderness to palpation.   Lungs:   Chest shape is normal. Breath sounds heard bilaterally equally.  No wheezing.  Audible air exchange noted all lung fields.   Heart:  Normal S1 and S2, no murmur, no gallop, no rub. No JVD.   Abdomen:   Normal bowel sounds, no masses, no organomegaly. Soft, non-tender, non-distended, no guarding    Extremities: Moves all extremities, with chronic bilateral lower extremity edema, cyanosis or clubbing.  Frail build.  Poor core strength/stability.   Pulses: Pulses palpable, greater to the right DP/PT than left and    Skin: Wound noted to left foot.  Generalized dry skin noted.  Age-related atrophy of skin.   Neurologic: [x] Normal speech []  Normal mental status    [x] Cranial nerves II through XII intact   [x]  No anosmia [x]  DTR 2+ [x]  Proprioception intact  [x]  No focal motor/sensory deficits      Psych/Mood:                     [x]  No acute changes []  Depressed        Urinary:      [x]  Continent  []  Incontinent  []  Retention  []  F/C      []  UTI w/treatment in progress         ASSESSMENT     Diagnoses and all orders for this visit:    1. Osteomyelitis of fifth toe of left foot (Primary)    2. Physical deconditioning    3. Impaired mobility and ADLs    4. Chronic respiratory failure with hypoxia    5. BPH with obstruction/lower urinary tract symptoms    6. Anticoagulant long-term use    7. Pulmonary embolism, bilateral    8. Chronic systolic congestive heart failure    9. Peripheral vascular disease          PLAN  Continue current supportive care for mobilization/transfer assistance, as well as ADLs of need.  No nutritional/hydration deficits reported.    Continue full course of Diflucan and treatment of Candida UTI.    Continue anticoagulation, vital signs demonstrate hemodynamic stability.  No increased work of breathing.    Continue low-sodium/salt dietary intake, maintain appropriate hydration status.  Frequent weight evaluation, changes to treatment regimen made based on clinical findings of acute volume overload.    Pain appears clinically well controlled.    Surveillance labs when needed.    [x]  Discussed Patient in detail with nursing/staff, addressed all needs today.     [x]  Plan of Care Reviewed   [x]  PT/OT Reviewed   []  Order Changes  []  Discharge Plans Reviewed   []  Code Status Changes    I spent 30 minutes caring for Wale on this date of service. This time includes time spent by me in the following activities:preparing for the visit, performing a medically appropriate examination and/or evaluation , counseling and educating the patient/family/caregiver, ordering medications, tests, or procedures, documenting information in the medical record, and care coordination       Rishi Castellano DO  08/23/2023

## 2023-10-18 NOTE — PROGRESS NOTES
Nursing Home History/Physical        Rishifabian Castellano DO [x]   StaciHARLEY Das []  [address] [place] [address]  Phone: [phone]  Fax: [phone] Samm Mclaughlin MD []  Oscar Mena DO []  [address] [place] [address]  Phone: [phone]  Fax: [phone]     PATIENT NAME: Wale Mayes                                                                          YOB: 1950           DATE OF SERVICE: 07/19/2023  FACILITY:  []  [facility]  []  [facility]   []  [facility]   [x] [facility]    [] Other ______________________________________________________________________     CHIEF COMPLAINT:  Impaired mobility and ADLs/physical deconditioning/bilateral pulmonary emboli/anticoagulation/subacute osteomyelitis left foot, fifth phalanx/peripheral vascular disease/tobacco dependence/chronic systolic congestive heart failure      HISTORY OF PRESENT ILLNESS:      [x]  Initial visit for coordination of rehabilitative care issues and chronic medical management needs.    Date of Admission: 7/8/2023  Date of Discharge: No discharge date for patient encounter.      Primary Care Physician: Leeann Fowler MD  Consultations: Treatment Team:  Consulting Physician: Ron Sotelo MD  Consulting Physician: Zurdo Davenport MD  Consulting Physician: Kael Quinn MD  Consulting Physician: Jordon Chen DPM  Consulting Physician: Gaudencio Butler DPM    Discharge Diagnoses:  Pulmonary embolism, bilateral (HCC)    Reason for Admission:    #1 chest pain  Admit to the hospital for cardiology evaluation patient with elevated troponin check EKG repeat troponin n.p.o. for cardiology  Check lipid profile hemoglobin A1c     #2 pulm embolism  Started heparin drip symptomatic treatment check venous Doppler for lower extremity     #3 history of COPD  Start breathing treatment chest chest x-ray  Continuous oxygen     #4 weakness.  Physical therapy when patient medically stable     #5 hypertension start  hydralazine as needed     #6 GI prophylaxis Pepcid     #7 DVT prophylaxis heparin     #8 urinary retention  We will consult urology for evaluation    Hospital Course:   Acute on chronic systolic heart failure -proBNP >35,000  ? ECHO (5/23) EF 23% at [facility]   Elevated troponin, NSTEMI versus type II MI  ? Trop 1600, 1700   COPD/emphysema moderate on CT chest     ongoing tobacco abuse   History of EtOH/substance abuse   PVD with bilateral toe skin changes  ? -bilateral Iliac monophasic flow with left CFA occlusion   Subsegmental PE, low risk, low clot burden   Leukocytosis, WBC 23- osteomyelitis   Confusion     PLAN:  07/13/23  72-year-old male patient with complex extensive cardiac and vascular history. Patient noncompliance and poor psychosocial support. We have been unable to contact family for the past few days. Patient at this point is not a candidate for either cardiac or vascular interventions and would recommend hospice care. May consider switching to NOACs at this point. Further management per the primary team. We will sign off contact us with any further questions.    Patient was also diagnosed with acute/subacute osteomyelitis of the fifth phalanx of the left foot.  Started on antibiotics, followed by podiatry and infectious diseases.    Patient has had some worsening agitation, as well as having to be redirected not to smoke in his room.      PAST MEDICAL & SURGICAL HISTORY:   Past Medical History:   Diagnosis Date    Alcohol dependence     BPH (benign prostatic hyperplasia)     COPD (chronic obstructive pulmonary disease)     Coronary artery disease     Depression     GERD (gastroesophageal reflux disease)     Hypertension     Myocardial infarction     Nicotine dependence, cigarettes, with other nicotine-induced disorders     Pneumonia     Pulmonary embolism     SOB (shortness of breath)       Past Surgical History:   Procedure Laterality Date    CARDIAC SURGERY           MEDICATIONS:  I have reviewed and  "reconciled the patients medication list in the patients chart at the [facility] nursing facility today.      ALLERGIES:    No Known Allergies      SOCIAL HISTORY:    Social History     Socioeconomic History    Marital status: Single   Tobacco Use    Smoking status: Every Day     Packs/day: 2.00     Years: 20.00     Additional pack years: 0.00     Total pack years: 40.00     Types: Cigarettes    Smokeless tobacco: Never   Vaping Use    Vaping Use: Unknown   Substance and Sexual Activity    Alcohol use: Yes     Comment: \"once in a while\"    Drug use: Not Currently     Types: Marijuana    Sexual activity: Defer       FAMILY HISTORY:    Family History   Family history unknown: Yes       REVIEW OF SYSTEMS:    Review of Systems  Appetite: Fair []   Good [x]   Poor []   Weight Loss []  [x]  Weight Stable   Unavoidable Weight Loss []  Tolerating Tube Feeding []    Supplements Provided []   Constitutional: Negative for fever, chills, diaphoresis or fatigue and weight change.   HENT: No dysphagia; no changes to vision/hearing/smell/taste; no epistaxis  Eyes: Negative for redness and visual disturbance.   Respiratory: Negative for shortness of breath, chest pain, cough or chest tightness.   Cardiovascular: Negative for chest pain and palpitations.   Gastrointestinal: Negative for abdominal distention, abdominal pain and blood in stool.   Endocrine: Negative for cold intolerance and heat intolerance.   Genitourinary: Negative for difficulty urinating, dysuria and frequency.Negative for hematuria   Musculoskeletal: Chronic myalgias and arthralgias  Integumentary: Left foot wound.  Neurological: Negative for syncope; generalized weakness.  Hematological: Negative for adenopathy. Does not bruise/bleed easily.   Immunological: Negative for reported allergies or immunological disorders  Psychological: As per above.    PHYSICAL EXAMINATION:   VITAL SIGNS:   Vitals:    07/19/23 0835   BP: 100/62   Pulse: 71   Resp: 18   Temp: 97.9 °F " (36.6 °C)   SpO2: 99%         Physical Exam  General Appearance:  [x]  Alert   [x]  Oriented x person  [x]  No acute distress     []  Confused  []  Disoriented   []  Comatose   Head:  Atraumatic and normocephalic, without obvious abnormality.   Eyes:         PERRLA, conjunctivae and sclerae normal, no Icterus. No pallor. Extra-occular movements are within normal limits.   Ears:  Ears appear intact with no abnormalities noted.   Throat: No oral lesions, no thrush, oral mucosa moist.   Neck: Supple, trachea midline, no thyromegaly, no carotid bruit.   Back:   No kyphoscoliosis. No tenderness to palpation.   Lungs:   Chest shape is normal. Breath sounds heard bilaterally equally.  No wheezing.  Audible air exchange noted all lung fields.   Heart:  Normal S1 and S2, no murmur, no gallop, no rub. No JVD.   Abdomen:   Normal bowel sounds, no masses, no organomegaly. Soft, non-tender, non-distended, no guarding    Extremities: Moves all extremities, with chronic bilateral lower extremity edema, cyanosis or clubbing.  Frail build.  Poor core strength/stability.   Pulses: Pulses palpable, greater to the right DP/PT than left and    Skin: Wound noted to left foot.  Generalized dry skin noted.  Age-related atrophy of skin.   Neurologic: [x] Normal speech []  Normal mental status    [x] Cranial nerves II through XII intact   [x]  No anosmia [x]  DTR 2+ [x]  Proprioception intact  [x]  No focal motor/sensory deficits      Psych/Mood:                    [x]  No acute changes []  Depressed      Urinary:      []  Continent  []  Incontinent  []  Retention  []  F/C     []  UTI w/treatment in progress      ASSESSMENT     Diagnoses and all orders for this visit:    1. Impaired mobility and ADLs (Primary)    2. Pulmonary embolism, bilateral    3. Anticoagulant long-term use    4. Chronic systolic congestive heart failure    5. BPH with obstruction/lower urinary tract symptoms    6. Acute on chronic respiratory failure with hypoxia    7.  Chronic respiratory failure with hypoxia    8. Physical deconditioning    9. Osteomyelitis of fifth toe of left foot    10. Peripheral vascular disease    11. Nicotine dependence, uncomplicated, unspecified nicotine product type        PLAN  Continue current antibiotic regimen as directed by foot/ankle specialty and infectious disease.  Utilize PICC line when placed.    Continue anticoagulation.  Demonstrates no increased work of breathing.  Vital signs demonstrate hemodynamic stability.    Patient currently utilizing nicotine transdermal patches.  Planned utilization of 14 mcg strength, then decrease to 7 mcg strength.    Continue Depakote for mood stabilization.    Continue supportive care for mobilization/transfer assistance, assist ADLs as needed.  Continue to follow nutritional/hydration status.    Utilize low-sodium/salt dietary intake.  Frequent weight evaluations.  Vital signs demonstrate hemodynamic stability.  No obvious findings of acute volume overload at this time.    Surveillance labs when needed.    [x]  Discussed Patient in detail with nursing/staff, addressed all needs today.     [x]  Plan of Care Reviewed   []  PT/OT Reviewed   []  Order Changes  []  Discharge Plans Reviewed  []  Code Status Change        I spent 45 minutes caring for Wale on this date of service. This time includes time spent by me in the following activities:preparing for the visit, reviewing tests, obtaining and/or reviewing a separately obtained history, performing a medically appropriate examination and/or evaluation , counseling and educating the patient/family/caregiver, ordering medications, tests, or procedures, documenting information in the medical record, and care coordination    Rishi Castellano   07/19/2023